# Patient Record
Sex: FEMALE | URBAN - METROPOLITAN AREA
[De-identification: names, ages, dates, MRNs, and addresses within clinical notes are randomized per-mention and may not be internally consistent; named-entity substitution may affect disease eponyms.]

---

## 2017-10-26 ENCOUNTER — IMPORTED ENCOUNTER (OUTPATIENT)
Dept: URBAN - METROPOLITAN AREA CLINIC 9 | Facility: CLINIC | Age: 57
End: 2017-10-26

## 2018-04-12 ENCOUNTER — IMPORTED ENCOUNTER (OUTPATIENT)
Dept: URBAN - METROPOLITAN AREA CLINIC 9 | Facility: CLINIC | Age: 58
End: 2018-04-12

## 2018-04-19 ENCOUNTER — IMPORTED ENCOUNTER (OUTPATIENT)
Dept: URBAN - METROPOLITAN AREA CLINIC 9 | Facility: CLINIC | Age: 58
End: 2018-04-19

## 2018-05-21 ENCOUNTER — IMPORTED ENCOUNTER (OUTPATIENT)
Dept: URBAN - METROPOLITAN AREA CLINIC 9 | Facility: CLINIC | Age: 58
End: 2018-05-21

## 2018-10-15 ENCOUNTER — IMPORTED ENCOUNTER (OUTPATIENT)
Dept: URBAN - METROPOLITAN AREA CLINIC 9 | Facility: CLINIC | Age: 58
End: 2018-10-15

## 2018-10-29 ENCOUNTER — IMPORTED ENCOUNTER (OUTPATIENT)
Dept: URBAN - METROPOLITAN AREA CLINIC 9 | Facility: CLINIC | Age: 58
End: 2018-10-29

## 2018-11-12 ENCOUNTER — IMPORTED ENCOUNTER (OUTPATIENT)
Dept: URBAN - METROPOLITAN AREA CLINIC 9 | Facility: CLINIC | Age: 58
End: 2018-11-12

## 2020-06-19 ENCOUNTER — IMPORTED ENCOUNTER (OUTPATIENT)
Dept: URBAN - METROPOLITAN AREA CLINIC 9 | Facility: CLINIC | Age: 60
End: 2020-06-19

## 2020-06-30 ENCOUNTER — IMPORTED ENCOUNTER (OUTPATIENT)
Dept: URBAN - METROPOLITAN AREA CLINIC 9 | Facility: CLINIC | Age: 60
End: 2020-06-30

## 2020-06-30 PROBLEM — H25.12: Noted: 2020-06-30

## 2020-06-30 PROBLEM — Z96.1: Noted: 2020-06-30

## 2021-02-15 NOTE — PATIENT DISCUSSION
detailed refraction, no significant prescription change, recheck in one year, having mild/moderate symptoms with night driving. Only slight change found in prescription. Explained limitations of eyeglass prescription change in detail.

## 2021-03-15 NOTE — PATIENT DISCUSSION
retested refraction today, another myopic shift. Myopic shift since original visit 06/08/17. Remake right lens only in clear and sunglasses. Recheck in 6-12 months. follow-up PRN cataract surgery consultation if unhappy  with best corrected vision.

## 2021-10-16 ASSESSMENT — VISUAL ACUITY
OS_CC: 20/20 SN
OD_SC: 20/60 SN
OD_CC: 20/40 -2 SN
OS_CC: 20/20 SN
OD_CC: 20/60 SN
OS_SC: 20/60 SN
OD_CC: 20/70 SN
OD_CC: 20/40 - SN
OS_CC: 20/25 SN
OD_CC: 20/40 -2 SN
OS_SC: 20/20 SN
OS_CC: 20/25 SN
OS_CC: 20/20 SN
OD_CC: 20/50 + SN
OS_CC: 20/20 SN
OD_CC: 20/50 SN
OD_CC: 20/50 -2 SN
OD_SC: 20/40 - SN
OS_CC: 20/20 - SN
OD_CC: 20/60 SN
OS_CC: 20/20 -2 SN
OS_CC: 20/25 -2 SN
OS_CC: 20/20 SN
OS_CC: 20/20 SN

## 2021-10-16 ASSESSMENT — TONOMETRY
OS_IOP_MMHG: 12
OD_IOP_MMHG: 13
OS_IOP_MMHG: 13
OD_IOP_MMHG: 15
OD_IOP_MMHG: 14
OD_IOP_MMHG: 14
OD_IOP_MMHG: 13
OS_IOP_MMHG: 17
OS_IOP_MMHG: 13
OD_IOP_MMHG: 16
OD_IOP_MMHG: 15
OS_IOP_MMHG: 16
OS_IOP_MMHG: 15
OS_IOP_MMHG: 15
OD_IOP_MMHG: 16
OS_IOP_MMHG: 13

## 2021-10-16 ASSESSMENT — KERATOMETRY
OD_AXISANGLE2_DEGREES: 118
OD_K1POWER_DIOPTERS: 45.75
OD_AXISANGLE_DEGREES: 28
OS_K1POWER_DIOPTERS: 45.5
OS_AXISANGLE_DEGREES: 126
OS_AXISANGLE2_DEGREES: 36
OD_K2POWER_DIOPTERS: 46.5
OS_K2POWER_DIOPTERS: 46.5

## 2022-07-01 RX ORDER — PRUCALOPRIDE 2 MG/1
TABLET, FILM COATED ORAL
COMMUNITY

## 2022-10-14 PROBLEM — F41.9 ANXIETY: Status: ACTIVE | Noted: 2022-10-14

## 2022-10-14 PROBLEM — J02.9 SORE THROAT: Status: ACTIVE | Noted: 2022-10-14

## 2022-10-14 PROBLEM — K59.01 CONSTIPATION BY DELAYED COLONIC TRANSIT: Status: ACTIVE | Noted: 2022-10-14

## 2022-10-14 PROBLEM — N95.9 MENOPAUSAL AND POSTMENOPAUSAL DISORDER: Status: ACTIVE | Noted: 2022-10-14

## 2022-10-14 PROBLEM — D25.1 INTRAMURAL LEIOMYOMA OF UTERUS: Status: ACTIVE | Noted: 2022-10-14

## 2023-05-01 ENCOUNTER — COMPREHENSIVE EXAM (OUTPATIENT)
Dept: URBAN - METROPOLITAN AREA CLINIC 16 | Facility: CLINIC | Age: 63
End: 2023-05-01

## 2023-05-01 DIAGNOSIS — Z96.1: ICD-10-CM

## 2023-05-01 DIAGNOSIS — H25.12: ICD-10-CM

## 2023-05-01 DIAGNOSIS — H04.123: ICD-10-CM

## 2023-05-01 DIAGNOSIS — H53.021: ICD-10-CM

## 2023-05-01 DIAGNOSIS — H43.813: ICD-10-CM

## 2023-05-01 PROCEDURE — 92015 DETERMINE REFRACTIVE STATE: CPT

## 2023-05-01 PROCEDURE — 92014 COMPRE OPH EXAM EST PT 1/>: CPT

## 2023-05-01 ASSESSMENT — VISUAL ACUITY
OD_CC: 20/50-1
OU_CC: 20/20
OS_CC: 20/20-1

## 2023-05-01 ASSESSMENT — TONOMETRY
OD_IOP_MMHG: 24
OS_IOP_MMHG: 17